# Patient Record
Sex: FEMALE | URBAN - NONMETROPOLITAN AREA
[De-identification: names, ages, dates, MRNs, and addresses within clinical notes are randomized per-mention and may not be internally consistent; named-entity substitution may affect disease eponyms.]

---

## 2022-04-14 ENCOUNTER — APPOINTMENT (RX ONLY)
Dept: URBAN - METROPOLITAN AREA CLINIC 16 | Facility: CLINIC | Age: 62
Setting detail: DERMATOLOGY
End: 2022-04-14

## 2022-04-14 VITALS — HEIGHT: 65 IN | WEIGHT: 176 LBS

## 2022-04-14 DIAGNOSIS — D22 MELANOCYTIC NEVI: ICD-10-CM | Status: STABLE

## 2022-04-14 DIAGNOSIS — L82.1 OTHER SEBORRHEIC KERATOSIS: ICD-10-CM | Status: STABLE

## 2022-04-14 DIAGNOSIS — D18.0 HEMANGIOMA: ICD-10-CM | Status: STABLE

## 2022-04-14 DIAGNOSIS — L81.4 OTHER MELANIN HYPERPIGMENTATION: ICD-10-CM | Status: STABLE

## 2022-04-14 PROBLEM — D18.01 HEMANGIOMA OF SKIN AND SUBCUTANEOUS TISSUE: Status: ACTIVE | Noted: 2022-04-14

## 2022-04-14 PROBLEM — D22.5 MELANOCYTIC NEVI OF TRUNK: Status: ACTIVE | Noted: 2022-04-14

## 2022-04-14 PROCEDURE — ? COUNSELING

## 2022-04-14 PROCEDURE — 99203 OFFICE O/P NEW LOW 30 MIN: CPT

## 2022-04-14 ASSESSMENT — LOCATION SIMPLE DESCRIPTION DERM
LOCATION SIMPLE: RIGHT UPPER BACK
LOCATION SIMPLE: RIGHT FOREARM
LOCATION SIMPLE: RIGHT POPLITEAL SKIN
LOCATION SIMPLE: LEFT FOREARM
LOCATION SIMPLE: UPPER BACK
LOCATION SIMPLE: LEFT POPLITEAL SKIN
LOCATION SIMPLE: CHEST
LOCATION SIMPLE: LEFT POSTERIOR UPPER ARM
LOCATION SIMPLE: ABDOMEN
LOCATION SIMPLE: LEFT THIGH
LOCATION SIMPLE: RIGHT THIGH
LOCATION SIMPLE: RIGHT ELBOW

## 2022-04-14 ASSESSMENT — LOCATION DETAILED DESCRIPTION DERM
LOCATION DETAILED: MIDDLE STERNUM
LOCATION DETAILED: LEFT ANTERIOR DISTAL THIGH
LOCATION DETAILED: PERIUMBILICAL SKIN
LOCATION DETAILED: LEFT POPLITEAL SKIN
LOCATION DETAILED: INFERIOR THORACIC SPINE
LOCATION DETAILED: RIGHT PROXIMAL DORSAL FOREARM
LOCATION DETAILED: LEFT PROXIMAL DORSAL FOREARM
LOCATION DETAILED: RIGHT POPLITEAL SKIN
LOCATION DETAILED: RIGHT ANTERIOR DISTAL THIGH
LOCATION DETAILED: RIGHT INFERIOR MEDIAL UPPER BACK
LOCATION DETAILED: RIGHT ELBOW
LOCATION DETAILED: LEFT DISTAL POSTERIOR UPPER ARM

## 2022-04-14 ASSESSMENT — LOCATION ZONE DERM
LOCATION ZONE: ARM
LOCATION ZONE: LEG
LOCATION ZONE: TRUNK

## 2022-04-14 NOTE — PROCEDURE: MIPS QUALITY
Quality 130: Documentation Of Current Medications In The Medical Record: Current Medications Documented
Quality 402: Tobacco Use And Help With Quitting Among Adolescents: Patient screened for tobacco and never smoked
Quality 110: Preventive Care And Screening: Influenza Immunization: Influenza Immunization Administered during Influenza season
Quality 265: Biopsy Follow-Up: Biopsy results reviewed, communicated, tracked, and documented
Quality 128: Preventive Care And Screening: Body Mass Index (Bmi) Screening And Follow-Up Plan: BMI is documented above normal parameters and a follow-up plan is documented
Detail Level: Detailed
Quality 431: Preventive Care And Screening: Unhealthy Alcohol Use - Screening: Patient not identified as an unhealthy alcohol user when screened for unhealthy alcohol use using a systematic screening method

## 2023-04-04 ENCOUNTER — APPOINTMENT (RX ONLY)
Dept: URBAN - METROPOLITAN AREA CLINIC 16 | Facility: CLINIC | Age: 63
Setting detail: DERMATOLOGY
End: 2023-04-04

## 2023-04-04 VITALS — WEIGHT: 185 LBS | HEIGHT: 65 IN

## 2023-04-04 DIAGNOSIS — L82.1 OTHER SEBORRHEIC KERATOSIS: ICD-10-CM | Status: STABLE

## 2023-04-04 DIAGNOSIS — D18.0 HEMANGIOMA: ICD-10-CM | Status: STABLE

## 2023-04-04 DIAGNOSIS — D22 MELANOCYTIC NEVI: ICD-10-CM | Status: STABLE

## 2023-04-04 DIAGNOSIS — L21.8 OTHER SEBORRHEIC DERMATITIS: ICD-10-CM | Status: INADEQUATELY CONTROLLED

## 2023-04-04 DIAGNOSIS — L23.9 ALLERGIC CONTACT DERMATITIS, UNSPECIFIED CAUSE: ICD-10-CM

## 2023-04-04 PROBLEM — D18.01 HEMANGIOMA OF SKIN AND SUBCUTANEOUS TISSUE: Status: ACTIVE | Noted: 2023-04-04

## 2023-04-04 PROBLEM — D22.61 MELANOCYTIC NEVI OF RIGHT UPPER LIMB, INCLUDING SHOULDER: Status: ACTIVE | Noted: 2023-04-04

## 2023-04-04 PROCEDURE — ? OTHER

## 2023-04-04 PROCEDURE — ? COUNSELING

## 2023-04-04 PROCEDURE — 99213 OFFICE O/P EST LOW 20 MIN: CPT

## 2023-04-04 ASSESSMENT — LOCATION SIMPLE DESCRIPTION DERM
LOCATION SIMPLE: CHEST
LOCATION SIMPLE: NECK
LOCATION SIMPLE: LEFT POPLITEAL SKIN
LOCATION SIMPLE: RIGHT POPLITEAL SKIN
LOCATION SIMPLE: RIGHT EAR
LOCATION SIMPLE: RIGHT UPPER BACK
LOCATION SIMPLE: RIGHT SHOULDER

## 2023-04-04 ASSESSMENT — LOCATION DETAILED DESCRIPTION DERM
LOCATION DETAILED: RIGHT POSTERIOR SHOULDER
LOCATION DETAILED: LEFT POPLITEAL SKIN
LOCATION DETAILED: RIGHT SUPERIOR MEDIAL UPPER BACK
LOCATION DETAILED: LEFT CENTRAL LATERAL NECK
LOCATION DETAILED: RIGHT MEDIAL SUPERIOR CHEST
LOCATION DETAILED: LEFT MEDIAL SUPERIOR CHEST
LOCATION DETAILED: RIGHT TRIANGULAR FOSSA
LOCATION DETAILED: RIGHT POPLITEAL SKIN

## 2023-04-04 ASSESSMENT — LOCATION ZONE DERM
LOCATION ZONE: LEG
LOCATION ZONE: ARM
LOCATION ZONE: NECK
LOCATION ZONE: EAR
LOCATION ZONE: TRUNK

## 2023-04-04 NOTE — PROCEDURE: MIPS QUALITY
Detail Level: Detailed
Quality 265: Biopsy Follow-Up: Biopsy results reviewed, communicated, tracked, and documented
Quality 130: Documentation Of Current Medications In The Medical Record: Current Medications Documented
Quality 402: Tobacco Use And Help With Quitting Among Adolescents: Patient screened for tobacco and never smoked
Quality 128: Preventive Care And Screening: Body Mass Index (Bmi) Screening And Follow-Up Plan: BMI is documented above normal parameters and a follow-up plan is documented
Quality 110: Preventive Care And Screening: Influenza Immunization: Influenza Immunization Administered during Influenza season
Quality 431: Preventive Care And Screening: Unhealthy Alcohol Use - Screening: Patient not identified as an unhealthy alcohol user when screened for unhealthy alcohol use using a systematic screening method

## 2023-04-04 NOTE — PROCEDURE: COUNSELING
Detail Level: Zone
Patient Specific Counseling (Will Not Stick From Patient To Patient): Recommended Amlactin or Cerave SA.
Detail Level: Detailed
Patient Specific Counseling (Will Not Stick From Patient To Patient): Patient uses hydrocortisone sparingly, she has at home.

## 2023-04-04 NOTE — PROCEDURE: OTHER
Other (Free Text): Patient states she does not need prescription cream for this.
Render Risk Assessment In Note?: no
Detail Level: Zone
Note Text (......Xxx Chief Complaint.): This diagnosis correlates with the

## 2024-04-17 ENCOUNTER — APPOINTMENT (RX ONLY)
Dept: URBAN - METROPOLITAN AREA CLINIC 15 | Facility: CLINIC | Age: 64
Setting detail: DERMATOLOGY
End: 2024-04-17

## 2024-04-17 DIAGNOSIS — D22 MELANOCYTIC NEVI: ICD-10-CM | Status: STABLE

## 2024-04-17 DIAGNOSIS — D18.0 HEMANGIOMA: ICD-10-CM | Status: STABLE

## 2024-04-17 DIAGNOSIS — L82.1 OTHER SEBORRHEIC KERATOSIS: ICD-10-CM | Status: STABLE

## 2024-04-17 DIAGNOSIS — D485 NEOPLASM OF UNCERTAIN BEHAVIOR OF SKIN: ICD-10-CM | Status: STABLE

## 2024-04-17 DIAGNOSIS — L81.4 OTHER MELANIN HYPERPIGMENTATION: ICD-10-CM | Status: STABLE

## 2024-04-17 PROBLEM — D48.5 NEOPLASM OF UNCERTAIN BEHAVIOR OF SKIN: Status: ACTIVE | Noted: 2024-04-17

## 2024-04-17 PROBLEM — D22.5 MELANOCYTIC NEVI OF TRUNK: Status: ACTIVE | Noted: 2024-04-17

## 2024-04-17 PROBLEM — D18.01 HEMANGIOMA OF SKIN AND SUBCUTANEOUS TISSUE: Status: ACTIVE | Noted: 2024-04-17

## 2024-04-17 PROCEDURE — 11300 SHAVE SKIN LESION 0.5 CM/<: CPT

## 2024-04-17 PROCEDURE — ? COUNSELING

## 2024-04-17 PROCEDURE — ? SUNSCREEN RECOMMENDATIONS

## 2024-04-17 PROCEDURE — ? SHAVE REMOVAL

## 2024-04-17 PROCEDURE — 99213 OFFICE O/P EST LOW 20 MIN: CPT | Mod: 25

## 2024-04-17 ASSESSMENT — LOCATION DETAILED DESCRIPTION DERM
LOCATION DETAILED: SUPERIOR THORACIC SPINE
LOCATION DETAILED: SUBXIPHOID
LOCATION DETAILED: RIGHT PROXIMAL POSTERIOR UPPER ARM
LOCATION DETAILED: LEFT CENTRAL MALAR CHEEK

## 2024-04-17 ASSESSMENT — LOCATION ZONE DERM
LOCATION ZONE: ARM
LOCATION ZONE: TRUNK
LOCATION ZONE: FACE

## 2024-04-17 ASSESSMENT — LOCATION SIMPLE DESCRIPTION DERM
LOCATION SIMPLE: RIGHT POSTERIOR UPPER ARM
LOCATION SIMPLE: ABDOMEN
LOCATION SIMPLE: UPPER BACK
LOCATION SIMPLE: LEFT CHEEK

## 2024-04-17 NOTE — PROCEDURE: SHAVE REMOVAL
Medical Necessity Information: It is in your best interest to select a reason for this procedure from the list below. All of these items fulfill various CMS LCD requirements except the new and changing color options.
Medical Necessity Clause: This procedure was medically necessary because the lesion that was treated was:
Lab: [id number]
Lab Facility: 0
Detail Level: Detailed
Was A Bandage Applied: Yes
Size Of Lesion In Cm (Required): 0.5
Depth Of Shave: dermis
Biopsy Method: Dermablade
Anesthesia Type: 1% lidocaine without epinephrine and a 1:10 solution of 8.4% sodium bicarbonate
Hemostasis: Electrocautery
Wound Care: No ointment
Render Path Notes In Note?: No
Consent was obtained from the patient. The risks and benefits to therapy were discussed in detail. Specifically, the risks of infection, scarring, bleeding, prolonged wound healing, incomplete removal, allergy to anesthesia, nerve injury and recurrence were addressed. Prior to the procedure, the treatment site was clearly identified and confirmed by the patient. All components of Universal Protocol/PAUSE Rule completed.
Post-Care Instructions: I reviewed with the patient in detail post-care instructions. Patient is to keep the biopsy site dry overnight, and then apply bacitracin twice daily until healed. Patient may apply hydrogen peroxide soaks to remove any crusting.
Notification Instructions: Patient will be notified of pathology results. However, patient instructed to call the office if not contacted within 2 weeks.
Billing Type: Third-Party Bill

## 2024-04-17 NOTE — HPI: PREVENTATIVE SKIN CHECK
What Is The Reason For Today's Visit?: Full Body Skin Examination
Additional History: Spot under left eye.

## 2024-10-24 ENCOUNTER — APPOINTMENT (RX ONLY)
Dept: URBAN - METROPOLITAN AREA CLINIC 15 | Facility: CLINIC | Age: 64
Setting detail: DERMATOLOGY
End: 2024-10-24

## 2024-10-24 DIAGNOSIS — L663 OTHER SPECIFIED DISEASES OF HAIR AND HAIR FOLLICLES: ICD-10-CM | Status: INADEQUATELY CONTROLLED

## 2024-10-24 DIAGNOSIS — L73.9 FOLLICULAR DISORDER, UNSPECIFIED: ICD-10-CM | Status: INADEQUATELY CONTROLLED

## 2024-10-24 DIAGNOSIS — L81.4 OTHER MELANIN HYPERPIGMENTATION: ICD-10-CM | Status: STABLE

## 2024-10-24 DIAGNOSIS — Z00.00 ENCOUNTER FOR GENERAL ADULT MEDICAL EXAMINATION WITHOUT ABNORMAL FINDINGS: ICD-10-CM | Status: STABLE

## 2024-10-24 DIAGNOSIS — L738 OTHER SPECIFIED DISEASES OF HAIR AND HAIR FOLLICLES: ICD-10-CM | Status: INADEQUATELY CONTROLLED

## 2024-10-24 PROBLEM — L02.02 FURUNCLE OF FACE: Status: ACTIVE | Noted: 2024-10-24

## 2024-10-24 PROCEDURE — ? PRESCRIPTION MEDICATION MANAGEMENT

## 2024-10-24 PROCEDURE — ? PRESCRIPTION

## 2024-10-24 PROCEDURE — 99214 OFFICE O/P EST MOD 30 MIN: CPT

## 2024-10-24 PROCEDURE — ? COUNSELING

## 2024-10-24 RX ORDER — MUPIROCIN 20 MG/G
OINTMENT TOPICAL BID
Qty: 22 | Refills: 1 | Status: ERX | COMMUNITY
Start: 2024-10-24

## 2024-10-24 RX ADMIN — MUPIROCIN: 20 OINTMENT TOPICAL at 00:00

## 2024-10-24 ASSESSMENT — LOCATION ZONE DERM
LOCATION ZONE: TRUNK
LOCATION ZONE: ARM
LOCATION ZONE: SCALP
LOCATION ZONE: LEG
LOCATION ZONE: NOSE

## 2024-10-24 ASSESSMENT — LOCATION DETAILED DESCRIPTION DERM
LOCATION DETAILED: RIGHT NARIS
LOCATION DETAILED: RIGHT VENTRAL PROXIMAL FOREARM
LOCATION DETAILED: RIGHT PROXIMAL PRETIBIAL REGION
LOCATION DETAILED: RIGHT SUPERIOR PARIETAL SCALP
LOCATION DETAILED: RIGHT MEDIAL SUPERIOR CHEST
LOCATION DETAILED: LEFT VENTRAL PROXIMAL FOREARM
LOCATION DETAILED: LEFT KNEE
LOCATION DETAILED: SUPERIOR THORACIC SPINE

## 2024-10-24 ASSESSMENT — LOCATION SIMPLE DESCRIPTION DERM
LOCATION SIMPLE: RIGHT FOREARM
LOCATION SIMPLE: RIGHT NOSE
LOCATION SIMPLE: UPPER BACK
LOCATION SIMPLE: LEFT FOREARM
LOCATION SIMPLE: SCALP
LOCATION SIMPLE: RIGHT PRETIBIAL REGION
LOCATION SIMPLE: LEFT KNEE
LOCATION SIMPLE: CHEST

## 2024-10-24 NOTE — HPI: PREVENTATIVE SKIN CHECK
What Is The Reason For Today's Visit?: Full Body Skin Examination
Additional History: Check spot on head and inside nose.

## 2024-10-24 NOTE — PROCEDURE: PRESCRIPTION MEDICATION MANAGEMENT
Detail Level: Zone
Render In Strict Bullet Format?: No
Plan: See ENT if continues
Initiate Treatment: Mupirocin ointment Apply to spot inside nose twice a day for 10-14 days